# Patient Record
Sex: MALE | Race: WHITE | NOT HISPANIC OR LATINO | URBAN - METROPOLITAN AREA
[De-identification: names, ages, dates, MRNs, and addresses within clinical notes are randomized per-mention and may not be internally consistent; named-entity substitution may affect disease eponyms.]

---

## 2020-11-03 ENCOUNTER — OFFICE VISIT (OUTPATIENT)
Dept: URBAN - METROPOLITAN AREA CLINIC 80 | Facility: CLINIC | Age: 20
End: 2020-11-03

## 2020-11-17 ENCOUNTER — OFFICE VISIT (OUTPATIENT)
Dept: URBAN - METROPOLITAN AREA CLINIC 80 | Facility: CLINIC | Age: 20
End: 2020-11-17
Payer: COMMERCIAL

## 2020-11-17 ENCOUNTER — WEB ENCOUNTER (OUTPATIENT)
Dept: URBAN - METROPOLITAN AREA CLINIC 80 | Facility: CLINIC | Age: 20
End: 2020-11-17

## 2020-11-17 ENCOUNTER — DASHBOARD ENCOUNTERS (OUTPATIENT)
Age: 20
End: 2020-11-17

## 2020-11-17 VITALS
SYSTOLIC BLOOD PRESSURE: 140 MMHG | WEIGHT: 168.6 LBS | HEART RATE: 78 BPM | BODY MASS INDEX: 27.1 KG/M2 | HEIGHT: 66 IN | TEMPERATURE: 97.5 F | DIASTOLIC BLOOD PRESSURE: 95 MMHG

## 2020-11-17 DIAGNOSIS — K90.0 CELIAC DISEASE: ICD-10-CM

## 2020-11-17 PROCEDURE — 99213 OFFICE O/P EST LOW 20 MIN: CPT | Performed by: PHYSICIAN ASSISTANT

## 2020-11-17 PROCEDURE — 4004F PT TOBACCO SCREEN RCVD TLK: CPT | Performed by: PHYSICIAN ASSISTANT

## 2020-11-17 PROCEDURE — G8482 FLU IMMUNIZE ORDER/ADMIN: HCPCS | Performed by: PHYSICIAN ASSISTANT

## 2020-11-17 PROCEDURE — G8417 CALC BMI ABV UP PARAM F/U: HCPCS | Performed by: PHYSICIAN ASSISTANT

## 2020-11-17 PROCEDURE — G8427 DOCREV CUR MEDS BY ELIG CLIN: HCPCS | Performed by: PHYSICIAN ASSISTANT

## 2020-11-17 NOTE — HPI-TODAY'S VISIT:
Patient here for follow up He is remaining gluten free - he has had a beer every now and then and has no reaction to it No abd pain daily - rare pain under ribs on both sides and if he puts pressure on it than it is relieved - happens every other week for maybe an hour or so- started a few months ago - cannot relate it to anything He is having 1 BM a day  No BRBPR or melena He is not taking any vitamins He had blood work in October by his PCP - all looked good He went to the ER in August for a panic attack - chest xray was normal CBC and CMP in 9/2020 were also wnl

## 2025-08-13 ENCOUNTER — OFFICE VISIT (OUTPATIENT)
Dept: URBAN - METROPOLITAN AREA CLINIC 80 | Facility: CLINIC | Age: 25
End: 2025-08-13
Payer: COMMERCIAL

## 2025-08-13 DIAGNOSIS — K90.0 CELIAC DISEASE: ICD-10-CM

## 2025-08-13 PROCEDURE — 99204 OFFICE O/P NEW MOD 45 MIN: CPT | Performed by: PHYSICIAN ASSISTANT

## 2025-08-13 RX ORDER — FINASTERIDE 1 MG/1
AS DIRECTED TABLET, FILM COATED ORAL
Status: ACTIVE | COMMUNITY
Start: 2025-08-13

## 2025-08-13 RX ORDER — MINOXIDIL 2.5 MG/1
AS DIRECTED TABLET ORAL
Status: ACTIVE | COMMUNITY
Start: 2025-08-13

## 2025-08-17 LAB
A/G RATIO: 2.1
ABSOLUTE BASOPHILS: 32
ABSOLUTE EOSINOPHILS: 220
ABSOLUTE LYMPHOCYTES: 1308
ABSOLUTE MONOCYTES: 232
ABSOLUTE NEUTROPHILS: 2208
ALBUMIN: 5.2
ALKALINE PHOSPHATASE: 58
ALPHA-TOCOPHEROL: 11.8
ALT (SGPT): 11
AST (SGOT): 15
BASOPHILS: 0.8
BETA-GAMMA-TOCOPHEROL: 1.5
BILIRUBIN, TOTAL: 0.6
BUN/CREATININE RATIO: (no result)
BUN: 15
CALCIUM: 10.1
CARBON DIOXIDE, TOTAL: 27
CHLORIDE: 100
CREATININE: 1.02
EGFR: 104
EOSINOPHILS: 5.5
FERRITIN, SERUM: 143
FOLATE, SERUM: 10.1
GLOBULIN, TOTAL: 2.5
GLUCOSE: 100
HEMATOCRIT: 45.1
HEMOGLOBIN: 15.7
IMMUNOGLOBULIN A, QN, SERUM: 184
INTERPRETATION: (no result)
IRON BIND.CAP.(TIBC): 366
IRON SATURATION: 28
IRON: 102
LYMPHOCYTES: 32.7
MCH: 31.7
MCHC: 34.8
MCV: 91.1
MONOCYTES: 5.8
MPV: 9.3
NEUTROPHILS: 55.2
PLATELET COUNT: 245
POTASSIUM: 4
PROTEIN, TOTAL: 7.7
RDW: 12.7
RED BLOOD CELL COUNT: 4.95
SODIUM: 138
T-TRANSGLUTAMINASE (TTG) IGA: 14.5
VITAMIN A: 76
VITAMIN B12: 327
VITAMIN D,25-OH,TOTAL,IA: 47
VITAMIN K: 506
WHITE BLOOD CELL COUNT: 4